# Patient Record
Sex: FEMALE | Race: WHITE | Employment: UNEMPLOYED | URBAN - METROPOLITAN AREA
[De-identification: names, ages, dates, MRNs, and addresses within clinical notes are randomized per-mention and may not be internally consistent; named-entity substitution may affect disease eponyms.]

---

## 2022-06-22 NOTE — PROGRESS NOTES
ANNUAL EXAM AGES 40-64      Lisa Saul is a X6 ,  48 y.o. female   No LMP recorded. (Menstrual status: Other (see Comment)). She presents for her annual checkup. She is having no significant problems. Menstrual status:  Her periods are absent patient keeps Nuvaring in continuously. Contraception:  The current method of family planning is Nuvaring. Hormonal status:  She reports no perimenstrual type symptoms. She is not having vasomotor symptoms. The patient is not using any ERT. Sexual history:  She  reports being sexually active and has had partner(s) who are male. She reports using the following method of birth control/protection: Pill. Medical conditions:  Since her last annual GYN exam about one year ago, she has not the following changes in her health history: none. Pap and Mammogram History:  Her most recent Pap smear was normal, obtained 2021. The patient had a recent mammogram 6/15/2021 which was negative for malignancy. Gyn Flowsheet:    GYN HISTORY 2022   Mammogram Date 6/15/2021   Mammogram Results WNL   Pap Date 2021   Pap Results WNL neg HPV   Some recent data might be hidden       Breast Cancer History: neg. Osteoporosis History:  Family history does not include a first or second degree relative with osteopenia or osteoporosis. A bone density scan was not obtained. Medical History: There is no problem list on file for this patient.     Past Medical History:   Diagnosis Date    High grade squamous intraepithelial lesion (HGSIL), grade 3 KATLIN, on biopsy of cervix     Hypercholesteremia 2010    Menstrual migraine      Past Surgical History:   Procedure Laterality Date    CKC, AKA COLD KNIFE CONE  2001    HX LEEP PROCEDURE  2001    Positive Margins     OB History    Para Term  AB Living   2 2 2 0 0 2   SAB IAB Ectopic Molar Multiple Live Births   0 0 0 0 0 2      # Outcome Date GA Lbr Jason/2nd Weight Sex Delivery Anes PTL Lv   2 Term 02/15/97   7 lb 2 oz (3.232 kg) F Vag-Spont   TONYA   1 Term 12/12/91   7 lb 11 oz (3.487 kg) F Vag-Spont   TONYA      Obstetric Comments   Menarche 13     Social History     Socioeconomic History    Marital status:     Number of children: 2   Occupational History    Occupation:      Comment: Retired   Tobacco Use    Smoking status: Never Smoker    Smokeless tobacco: Never Used   Vaping Use    Vaping Use: Never used   Substance and Sexual Activity    Alcohol use: Yes    Drug use: Never    Sexual activity: Yes     Partners: Male     Birth control/protection: Pill      Family History   Problem Relation Age of Onset    Thyroid Disease Mother     Thyroid Cancer Mother     Hypertension Father     Diabetes Maternal Grandmother     Hypertension Maternal Grandmother     Heart Attack Maternal Grandfather      Current Outpatient Medications on File Prior to Visit   Medication Sig Dispense Refill    atorvastatin (LIPITOR) 80 mg tablet Take 80 mg by mouth daily.  ethinyl estradiol-etonogestrel (NUVARING) 0.12-0.015 mg/24 hr vaginal ring        No current facility-administered medications on file prior to visit.      Allergies   Allergen Reactions    Penicillins Hives       Review of Systems:  History obtained from the patient-negative for:  Constitutional: weight loss, fever, night sweats  HEENT: hearing loss, earache, congestion, snoring, sorethroat  CV: chest pain, palpitations, edema  Resp: cough, shortness of breath, wheezing  Breast: breast lumps, nipple discharge, galactorrhea  GI: change in bowel habits, abdominal pain, black or bloody stools  : frequency, dysuria, hematuria, vaginal discharge  MSK: back pain, joint pain, muscle pain  Skin: itching, rash, hives  Neuro: dizziness, headache, confusion, weakness  Psych: anxiety, depression, change in mood  Heme/lymph: bleeding, bruising, pallor    Physical Exam  Visit Vitals  BP (!) 151/91   Ht 5' 5\" (1.651 m) Wt 163 lb 6.4 oz (74.1 kg)   BMI 27.19 kg/m²       Constitutional  · Appearance: well-nourished, well developed, alert, in no acute distress    HENT  · Head and Face: appears normal    Neck  · Inspection/Palpation: normal appearance, no masses or tenderness  · Lymph Nodes: no lymphadenopathy present  · Thyroid: gland size normal, nontender, no nodules or masses present on palpation    Chest  · Respiratory Effort: breathing unlabored  · Auscultation: normal breath sounds    Cardiovascular  · Heart:  · Auscultation: regular rate and rhythm without murmur    Breasts  · Inspection of Breasts: breasts symmetrical, no skin changes, no discharge present, nipple appearance normal, no skin retraction present  · Palpation of Breasts and Axillae: no masses present on palpation, no breast tenderness  · Axillary Lymph Nodes: no lymphadenopathy present    Gastrointestinal  · Abdominal Examination: abdomen non-tender to palpation, normal bowel sounds, no masses present  · Liver and spleen: no hepatomegaly present, spleen not palpable  · Hernias: no hernias identified    Genitourinary  · External Genitalia: normal appearance for age, no discharge present, no tenderness present, no inflammatory lesions present, no masses present, no atrophy present  · Vagina: normal vaginal vault without central or paravaginal defects, no discharge present, no inflammatory lesions present, no masses present  · Bladder: non-tender to palpation  · Urethra: appears normal  · Cervix: normal   · Uterus: normal size, shape and consistency  · Adnexa: no adnexal tenderness present, no adnexal masses present  · Perineum: perineum within normal limits, no evidence of trauma, no rashes or skin lesions present  · Anus: anus within normal limits, no hemorrhoids present  · Inguinal Lymph Nodes: no lymphadenopathy present    Skin  · General Inspection: no rash, no lesions identified    Neurologic/Psychiatric  · Mental Status:  · Orientation: grossly oriented to person, place and time  · Mood and Affect: mood normal, affect appropriate    Labs:  No results found for this or any previous visit (from the past 12 hour(s)). Assessment:  Routine gynecologic examination  Her current medical status is satisfactory with no evidence of significant gynecologic issues.   Stable on Nuvaring    Plan:  Counseled re: diet, exercise, healthy lifestyle  Return for yearly wellness visits  Rec annual mammogram  Refill nuvaring

## 2022-06-24 ENCOUNTER — OFFICE VISIT (OUTPATIENT)
Dept: OBGYN CLINIC | Age: 47
End: 2022-06-24
Payer: COMMERCIAL

## 2022-06-24 VITALS
BODY MASS INDEX: 27.22 KG/M2 | SYSTOLIC BLOOD PRESSURE: 151 MMHG | DIASTOLIC BLOOD PRESSURE: 91 MMHG | HEIGHT: 65 IN | WEIGHT: 163.4 LBS

## 2022-06-24 DIAGNOSIS — Z01.419 ROUTINE GYNECOLOGICAL EXAMINATION: Primary | ICD-10-CM

## 2022-06-24 PROCEDURE — 99386 PREV VISIT NEW AGE 40-64: CPT | Performed by: OBSTETRICS & GYNECOLOGY

## 2022-06-24 RX ORDER — ETONOGESTREL AND ETHINYL ESTRADIOL 11.7; 2.7 MG/1; MG/1
INSERT, EXTENDED RELEASE VAGINAL
COMMUNITY
Start: 2022-04-14 | End: 2022-06-24 | Stop reason: SDUPTHER

## 2022-06-24 RX ORDER — ATORVASTATIN CALCIUM 80 MG/1
80 TABLET, FILM COATED ORAL DAILY
COMMUNITY
Start: 2022-06-11

## 2022-06-24 RX ORDER — ETONOGESTREL AND ETHINYL ESTRADIOL 11.7; 2.7 MG/1; MG/1
1 INSERT, EXTENDED RELEASE VAGINAL
Qty: 3 EACH | Refills: 3 | Status: SHIPPED | OUTPATIENT
Start: 2022-06-24 | End: 2022-09-22

## 2022-06-29 LAB
CYTOLOGIST CVX/VAG CYTO: NORMAL
CYTOLOGY CVX/VAG DOC CYTO: NORMAL
CYTOLOGY CVX/VAG DOC THIN PREP: NORMAL
DX ICD CODE: NORMAL
HPV I/H RISK 4 DNA CVX QL PROBE+SIG AMP: NEGATIVE
Lab: NORMAL
OTHER STN SPEC: NORMAL
STAT OF ADQ CVX/VAG CYTO-IMP: NORMAL

## 2023-07-21 RX ORDER — ETONOGESTREL AND ETHINYL ESTRADIOL 11.7; 2.7 MG/1; MG/1
INSERT, EXTENDED RELEASE VAGINAL
Qty: 1 EACH | Refills: 0 | Status: SHIPPED | OUTPATIENT
Start: 2023-07-21

## 2023-07-21 NOTE — TELEPHONE ENCOUNTER
50year old patient last seen in the office on 6/24/2022 and has next appointment on 7.25.2023    Prescription sent as per MD verbal order to get patient to her scheduled appointment

## 2023-07-25 ENCOUNTER — OFFICE VISIT (OUTPATIENT)
Age: 48
End: 2023-07-25
Payer: COMMERCIAL

## 2023-07-25 VITALS
BODY MASS INDEX: 27.82 KG/M2 | SYSTOLIC BLOOD PRESSURE: 139 MMHG | WEIGHT: 167 LBS | DIASTOLIC BLOOD PRESSURE: 94 MMHG | HEIGHT: 65 IN

## 2023-07-25 DIAGNOSIS — Z12.31 BREAST CANCER SCREENING BY MAMMOGRAM: ICD-10-CM

## 2023-07-25 DIAGNOSIS — Z01.419 WELL WOMAN EXAM WITH ROUTINE GYNECOLOGICAL EXAM: Primary | ICD-10-CM

## 2023-07-25 PROCEDURE — 99396 PREV VISIT EST AGE 40-64: CPT | Performed by: OBSTETRICS & GYNECOLOGY

## 2023-07-25 RX ORDER — ETONOGESTREL AND ETHINYL ESTRADIOL 11.7; 2.7 MG/1; MG/1
INSERT, EXTENDED RELEASE VAGINAL
Qty: 3 EACH | Refills: 0 | Status: SHIPPED | OUTPATIENT
Start: 2023-07-25 | End: 2023-10-23

## 2023-07-25 NOTE — PROGRESS NOTES
Emelyn Mcgill is a 50 y.o. female returns for an annual exam     Chief Complaint   Patient presents with    Annual Exam       No LMP recorded. (Menstrual status: Other - See Notes). Her periods are absent in flow. Problems: no problems  Birth Control: NuvaRing vaginal inserts continuously. Last Pap: normal obtained 1 year(s) ago. She does have a history of MATHEUS 2, 3 or cervical cancer. Last Mammogram: has not had a recent mammogram.     Last Bone Density:  not obtained. Last colonoscopy:  not obtained. 1. Have you been to the ER, urgent care clinic, or hospitalized since your last visit? No    2. Have you seen or consulted any other health care providers outside of the [address] [place] [address] Avenue since your last visit? No    Examination chaperoned by Maria Foote CMA.
present    Gastrointestinal  Abdominal Examination: abdomen non-tender to palpation, normal bowel sounds, no masses present  Liver and spleen: no hepatomegaly present, spleen not palpable  Hernias: no hernias identified    Genitourinary  External Genitalia: normal appearance for age, no discharge present, no tenderness present, no inflammatory lesions present, no masses present, no atrophy present  Vagina: normal vaginal vault without central or paravaginal defects, no discharge present, no inflammatory lesions present, no masses present  Bladder: non-tender to palpation  Urethra: appears normal  Cervix: normal   Uterus: normal size, shape and consistency  Adnexa: no adnexal tenderness present, no adnexal masses present  Perineum: perineum within normal limits, no evidence of trauma, no rashes or skin lesions present  Anus: anus within normal limits, no hemorrhoids present  Inguinal Lymph Nodes: no lymphadenopathy present    Skin  General Inspection: no rash, no lesions identified    Neurologic/Psychiatric  Mental Status:  Orientation: grossly oriented to person, place and time  Mood and Affect: mood normal, affect appropriate    Labs:  No results found for this or any previous visit (from the past 12 hour(s)). Assessment:  Problem List Items Addressed This Visit    None  Visit Diagnoses       Well woman exam with routine gynecological exam    -  Primary    Relevant Orders    PAP IG, Aptima HPV and rfx 16/18,45 ([id number])    Breast cancer screening by mammogram        Relevant Orders    BIRDIE Screening Bilateral          Plan:  Counseled re: diet, exercise, healthy lifestyle  Rec annual mammogram  Screening Colonoscopy advised.   Name & Number on tearoff sheet given for Dr. Rebecca Asencio and/or Dr. Eduardo Carolina @[facility]  Return in about 1 year (around 7/25/2024) for Annual.

## 2023-11-23 ENCOUNTER — TELEPHONE (OUTPATIENT)
Age: 48
End: 2023-11-23

## 2023-11-27 RX ORDER — ETONOGESTREL AND ETHINYL ESTRADIOL VAGINAL .015; .12 MG/D; MG/D
RING VAGINAL
Qty: 3 EACH | Refills: 0 | Status: SHIPPED | OUTPATIENT
Start: 2023-11-27

## 2023-11-27 NOTE — TELEPHONE ENCOUNTER
45 yo last ae 7/25/23    Rx last filled 7/25/23  PT last BP (!) 139/94  and was recommended to get bp checked at pcp.     Please review and sign as needed    Thank you

## 2023-11-27 NOTE — TELEPHONE ENCOUNTER
Two patient identifiers used    Patient went to get her BP checked this afternoon and it was 136/93    Patient was advised of need to set up appointment with her PcP and will contact the office back with further BP readings and to get further refills      FYI    Any recommendations    Please advise    Thank you

## 2023-11-27 NOTE — TELEPHONE ENCOUNTER
PT called name and  verfiied    Relayed MD refilled ocp but was inquiring about bp and fu on bp. PT states she was unaware of bp at that ov and was thankful she was reached out to about it. PT states she has not had a fu due to not knowing and will check it today. PT states she will call with bp.     DANIEL

## 2024-01-10 ENCOUNTER — TELEPHONE (OUTPATIENT)
Age: 49
End: 2024-01-10

## 2024-01-10 RX ORDER — ETONOGESTREL AND ETHINYL ESTRADIOL VAGINAL RING .015; .12 MG/D; MG/D
RING VAGINAL
Qty: 3 EACH | Refills: 0 | Status: SHIPPED | OUTPATIENT
Start: 2024-01-10

## 2024-01-10 RX ORDER — ETONOGESTREL AND ETHINYL ESTRADIOL VAGINAL RING .015; .12 MG/D; MG/D
RING VAGINAL
OUTPATIENT
Start: 2024-01-10
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
## 2024-02-29 ENCOUNTER — TELEPHONE (OUTPATIENT)
Age: 49
End: 2024-02-29

## 2024-02-29 RX ORDER — ETONOGESTREL AND ETHINYL ESTRADIOL VAGINAL RING .015; .12 MG/D; MG/D
RING VAGINAL
Qty: 3 EACH | Refills: 1 | Status: SHIPPED | OUTPATIENT
Start: 2024-02-29

## 2024-02-29 NOTE — TELEPHONE ENCOUNTER
PT call returned name and  verified    Relayed MD reviewed pended rx and sent refills until next scheduled ae.  Advised PT to keep ae to get further refills for the year by MD.  PT verbalizes understanding.

## 2024-02-29 NOTE — TELEPHONE ENCOUNTER
PT name and  verified    47 yo last ov 23 next ae 24    PT calling to update MD on bp followed by her PCP and get refills for her Nuvaring to get her to her next ae.  PT states she followed up on her bp and medication she was put on and her PCP took her off of bp medication due to her bp being low and the side effects of the medication/dropping her bp. PT needs refill on her Nuvaring, she has one for March, bur needs refills to get her to her next ae.  PT scheduled for 24 at 915 for her ae.    Rx pended for MD to review, and review follow up on bp, to refill to get PT to her ae.    Thank you

## 2024-07-29 ENCOUNTER — OFFICE VISIT (OUTPATIENT)
Age: 49
End: 2024-07-29
Payer: COMMERCIAL

## 2024-07-29 ENCOUNTER — TELEPHONE (OUTPATIENT)
Age: 49
End: 2024-07-29

## 2024-07-29 VITALS
HEIGHT: 65 IN | WEIGHT: 157.4 LBS | BODY MASS INDEX: 26.23 KG/M2 | DIASTOLIC BLOOD PRESSURE: 87 MMHG | SYSTOLIC BLOOD PRESSURE: 142 MMHG

## 2024-07-29 DIAGNOSIS — Z01.419 WELL WOMAN EXAM WITH ROUTINE GYNECOLOGICAL EXAM: Primary | ICD-10-CM

## 2024-07-29 PROCEDURE — 99396 PREV VISIT EST AGE 40-64: CPT | Performed by: OBSTETRICS & GYNECOLOGY

## 2024-07-29 RX ORDER — ETONOGESTREL AND ETHINYL ESTRADIOL VAGINAL RING .015; .12 MG/D; MG/D
RING VAGINAL
Qty: 3 EACH | Refills: 3 | Status: SHIPPED | OUTPATIENT
Start: 2024-07-29

## 2024-07-29 NOTE — TELEPHONE ENCOUNTER
Two patient identifiers used       48 years old patient last seen in the office today    Patient calling to confirm that a refill for her Nuvaring was sent to her pharmacy     Patient was advised the yes a prescription has been sent to her pharmacy for a year supply    Patient verbalized understanding.

## 2024-07-29 NOTE — PROGRESS NOTES
Charlene Morton is a 48 y.o. female returns for an annual exam     Chief Complaint   Patient presents with    Annual Exam       No LMP recorded. (Menstrual status: Other - See Notes).  Her periods are absent in flow.  Problems: no problems  Birth Control: NuvaRing vaginal inserts continuously.   Last Pap: normal obtained 1 year(s) ago.  She does have a history of MATHEUS 2, 3 or cervical cancer.   Last Mammogram: has not had a recent mammogram.     Last Bone Density:  not obtained.  Last colonoscopy:  not obtained.    Mom passed away in December 2023.        1. Have you been to the ER, urgent care clinic, or hospitalized since your last visit? Yes 2/2024 [facility] pain down left arm all test negative.       2. Have you seen or consulted any other health care providers outside of the [facility] since your last visit? No    Examination chaperoned by GAGAN CHRISTIAN CMA.  
        Charlene Morton (:  1975) is a 48 y.o. female, Established patient, here for evaluation of the following chief complaint(s):  Annual Exam         Assessment & Plan      Results    1. Well woman exam with routine gynecological exam  -     PAP IG, Aptima HPV and rfx 16/18,45 (); Future  -     BIRDIE Screening Bilateral; Future    Return in about 1 year (around 2025) for Annual.       Subjective u..  History of Present Illness  The patient presents for a Pap smear.    She reports no significant changes or concerns regarding her health. However, she did visit the emergency room in 2024 due to unusual sensations in her chest. These sensations would occasionally radiate down her shoulder, particularly when she was engaged in an activity, to the point where she was unable to lift her arm.    Review of Systems       Objective   Blood pressure (!) 142/87, height 1.651 m (5' 5\"), weight 71.4 kg (157 lb 6.4 oz).  Physical Exam             The patient (or guardian, if applicable) and other individuals in attendance with the patient were advised that Artificial Intelligence will be utilized during this visit to record, process the conversation to generate a clinical note and to support improvement of the AI technology. The patient (or guardian, if applicable) and other individuals in attendance at the appointment consented to the use of AI, including the recording.      An electronic signature was used to authenticate this note.    --Miguel Szymanski MD

## 2024-08-06 LAB
CYTOLOGIST CVX/VAG CYTO: NORMAL
CYTOLOGY CVX/VAG DOC CYTO: NORMAL
CYTOLOGY CVX/VAG DOC THIN PREP: NORMAL
DX ICD CODE: NORMAL
DX ICD CODE: NORMAL
HPV GENOTYPE REFLEX: NORMAL
HPV I/H RISK 4 DNA CVX QL PROBE+SIG AMP: NEGATIVE
Lab: NORMAL
OTHER STN SPEC: NORMAL
PATHOLOGIST CVX/VAG CYTO: NORMAL
RECOM F/U CVX/VAG CYTO: NORMAL
STAT OF ADQ CVX/VAG CYTO-IMP: NORMAL

## 2024-12-20 ENCOUNTER — TELEPHONE (OUTPATIENT)
Age: 49
End: 2024-12-20

## 2024-12-20 NOTE — TELEPHONE ENCOUNTER
Referral received to schedule a New Patient appointment for Exertional Headaches. Patient stated she will call us back to schedule.     Please schedule for the next available.

## 2025-06-24 ENCOUNTER — TELEPHONE (OUTPATIENT)
Age: 50
End: 2025-06-24

## 2025-07-14 RX ORDER — ETONOGESTREL AND ETHINYL ESTRADIOL VAGINAL RING .015; .12 MG/D; MG/D
RING VAGINAL
Refills: 3 | OUTPATIENT
Start: 2025-07-14

## 2025-07-21 ENCOUNTER — OFFICE VISIT (OUTPATIENT)
Age: 50
End: 2025-07-21
Payer: COMMERCIAL

## 2025-07-21 VITALS
RESPIRATION RATE: 17 BRPM | BODY MASS INDEX: 24.99 KG/M2 | DIASTOLIC BLOOD PRESSURE: 89 MMHG | OXYGEN SATURATION: 98 % | SYSTOLIC BLOOD PRESSURE: 147 MMHG | HEART RATE: 81 BPM | HEIGHT: 65 IN | WEIGHT: 150 LBS

## 2025-07-21 DIAGNOSIS — R51.9 NEW ONSET OF HEADACHES AFTER AGE 50: ICD-10-CM

## 2025-07-21 DIAGNOSIS — G44.84 EXERTIONAL HEADACHE: Primary | ICD-10-CM

## 2025-07-21 PROCEDURE — 99205 OFFICE O/P NEW HI 60 MIN: CPT | Performed by: NURSE PRACTITIONER

## 2025-07-21 RX ORDER — INDOMETHACIN 50 MG/1
CAPSULE ORAL
Qty: 60 CAPSULE | Refills: 3 | Status: SHIPPED | OUTPATIENT
Start: 2025-07-21

## 2025-07-22 NOTE — PROGRESS NOTES
Charlene Morton is a 49 y.o. female who presents with the following  Chief Complaint   Patient presents with    New Patient     Patient was referred for migraines. Patient reports that any time she gets hot. After she works out, cuts the grass, long walks. They will last 2-3 days when she gets them.        HPI    New patient to be evaluated for exertional headache    She states that it started about 2 to 3 years ago  She reports anytime that is hot outside or when she does something physical trigger the headache    She states that last about 2 to 3 days at a time    They usually are located in the entire head  She does not get any symptoms with them  Sometimes she will get overheated and they come on but no nausea vomiting dizziness  No tinnitus or visual changes or mental loss    It is very she states mostly after she works out  After she cuts the grass or goes on long walks  These headaches come on out of nowhere and are pretty debilitating  She has tried cold packs, over-the-counter medications with no relief  She is not on any medications daily other than atorvastatin 80 mg  She has no family history of migraines or headaches  No recent head traumas or falls  No neck pain  No paresthesia      No MRI   No other testing recently.       Allergies   Allergen Reactions    Penicillins Hives       Current Outpatient Medications   Medication Sig Dispense Refill    indomethacin (INDOCIN) 50 MG capsule 1-2 capsules by mouth 1 hour before activity PRN 60 capsule 3    etonogestrel-ethinyl estradiol (NUVARING) 0.12-0.015 MG/24HR vaginal ring 1 EACH BY INTRAVAGINAL ROUTE EVERY TWENTY-EIGHT (28) DAYS FOR 90 DAYS. 3 each 3    atorvastatin (LIPITOR) 80 MG tablet Take 1 tablet by mouth daily       No current facility-administered medications for this visit.        Social History     Tobacco Use   Smoking Status Never   Smokeless Tobacco Never       Past Medical History:   Diagnosis Date    High grade squamous intraepithelial